# Patient Record
(demographics unavailable — no encounter records)

---

## 2024-10-31 NOTE — PHYSICAL EXAM
[de-identified] : General appearance: well-nourished and hydrated, pleasant, alert and oriented x 3, cooperative. HEENT: Normocephalic, EOM intact, Nasal septum midline, Oral cavity clear, External auditory canal clear. Cardiovascular: no apparent abnormalities, no lower leg edema, no varicosities, pedal pulses are palpable. Lymphatics Lymph nodes: none palpated, Lymphedema: not present. Neurologic: sensation is normal, no muscle weakness in upper or lower extremities, patella tendon reflexes intact. Dermatologic no apparent skin lesions, moist, warm, no rash. Spine: cervical spine appears normal and moves freely, thoracic spine appears normal and moves freely, lumbosacral spine appears normal and moves freely. Gait: nonantalgic.   Left knee Inspection: no effusion or erythema. Wounds: none. Alignment: normal. Palpation: no specific tenderness on palpation. ROM active (in degrees): 0-135 with crepitus and pain Ligamentous laxity: all ligaments appear stable,, negative ant. drawer test, negative post. drawer test, stable to varus stress test, stable to valgus stress test. negative Lachman's test, negative pivot shift test Meniscal Test: negative McMurrays, negative Lilian. Patellofemoral Alignment Test: Q angle-, normal. Muscle Test: good quad strength. Leg examination: calf is soft and non-tender.   Right knee Inspection: no effusion or erythema. Wounds: none. Alignment: normal. Palpation: no specific tenderness on palpation. ROM active (in degrees): 0-135 with crepitus Ligamentous laxity: all ligaments appear stable, negative ant. drawer test, negative post. drawer test, stable to varus stress test, stable to valgus stress test. negative Lachman's test, negative pivot shift test Meniscal Test: negative McMurrays, negative Lilian. Patellofemoral Alignment Test: Q angle-, normal. Muscle Test: good quad strength. Leg examination: calf soft and non tender.   Left hip Inspection: No swelling or ecchymosis. Wounds: none. Palpation: non-tender. Stability: no instability. Strength: 5/5 all motor groups. ROM: no pain with FROM. Leg length: equal.   Right hip Inspection: No swelling or ecchymosis. Wounds: none. Palpation: non-tender. Stability: no instability. Strength: 5/5 all motor groups. ROM: no pain with FROM. Leg length: equal.   Left ankle Inspection: no erythema noted, no swelling noted. Palpation: no pain on palpation. ROM: FROM without crepitus. Muscle strength: 5/5. Stability: no instability noted.   Right ankle Inspection: no erythema noted, no swelling noted. ROM: FROM without crepitus. Palpation: no pain on palpation. Muscle strength: 5/5. Stability: no instability noted.   Left foot Inspection: color, texture and turgor are normal. ROM: full range of motion of all joints without pain or crepitus. Palpation: no tenderness. Stability: no instability noted.   Right foot Inspection: color, texture and turgor are normal. ROM: full range of motion of all joints without pain or crepitus. Palpation: no tenderness. Stability: no instability noted.   Left shoulder Inspection: no muscle asymmetry, no atrophy. Palpation: no tenderness noted, ACJ non-tender. ROM: full active ROM, full passive ROM. Strength testing): anterior deltoid, supraspinatus, infraspinatus, subscapularis all 5/5. Stability test: ant. apprehension negative, post. apprehension negative, relocation test negative. Impingement Test: negative NEER.   Right shoulder Inspection: no muscle asymmetry, no atrophy. Palpation: no tenderness noted, ACJ non-tender. ROM: full active ROM, full passive ROM. Strength testing): anterior deltoid, supraspinatus, infraspinatus, subscapularis all 5/5. Stability test: ant. apprehension negative, post. apprehension negative, relocation test negative. Impingement Test: negative NEER. Surgical Wounds: none.   Left elbow Inspection: negative swelling. Wounds: none. Palpation: non-tender. ROM: full ROM. Strength: 5/5 all groups. Stability: no instability. Mass: none.   Right elbow Inspection: negative swelling. Wounds: none. Palpation: non-tender. ROM: full ROM. Strength: 5/5 all groups. Stability: no instability. Mass: none.   Left wrist Inspection: negative swelling. Wound: none. Palpation (bone): no tenderness. ROM: full ROM. Strength: full , good.   Right wrist Inspection: negative swelling. Wound: none. Palpation (bone): no tenderness. ROM: full ROM. Strength: full , good.   Left hand Inspection: no skin changes, normal appearance. Wounds: none. Strength: full , able to make full fist. Sensation: light touch intact all fingers and thumb. Vascular: good capillary refill < 3 seconds, all fingers and thumb. Mass: none.   Right hand Inspection: no skin changes, normal appearance. Wounds: none. Strength: full , able to make full fist. Sensation: light touch intact all fingers and thumb. Vascular: good capillary refill < 3 seconds, all fingers and thumb. Mass: none. [de-identified] : Left knee xrays, standing AP/Lateral and Merchant films, and 45 degree PA standing view, taken at the office today show normal alignment, joint space maintained, slight traction spur superior pole of patella at central position, patellofemoral joint space narrowing consistent with patellofemoral arthritis.   Right knee x-ray merchant view taken at the office today demonstrates joint space narrowing, osteophytes and a well centered patella with Patellofemoral arthritis.   MRI left knee taken 05/30/2024: films brought in and reviewed, see attached report. I agree with radiologist report.  AP pelvis x-rays taken at the office today show mild degenerative arthritis, small acetabular osteophyte, multiple sutures in pelvic region.  AP/lateral x-rays of LS spine taken in office today shows normal alignment and disc narrowing of L2-L5.

## 2024-10-31 NOTE — DISCUSSION/SUMMARY
[de-identified] : Discussed at length the nature of the patients condition. Their right knee symptoms appear secondary to mild degenerative arthritis. However, I think most of his symptoms are related to lumbar radiculopathy, DDD, degenerative arthritis in both hips, and lumbar spondylosis. We will begin non-operatively. In the interim, patient was given a right knee cortisone injection today as detailed above for symptomatic relief. I also suggested PT, and Advil or Aleve prn. I also referred him to Dr. Heath for a spine evaluation, which he would need an MRI. They can continue activities as tolerated.  I will see him back in 6-8 weeks after he sees Dr. Heath.

## 2024-10-31 NOTE — DISCUSSION/SUMMARY
[de-identified] : Discussed at length the nature of the patients condition. Their right knee symptoms appear secondary to mild degenerative arthritis. However, I think most of his symptoms are related to lumbar radiculopathy, DDD, degenerative arthritis in both hips, and lumbar spondylosis. We will begin non-operatively. In the interim, patient was given a right knee cortisone injection today as detailed above for symptomatic relief. I also suggested PT, and Advil or Aleve prn. I also referred him to Dr. Heath for a spine evaluation, which he would need an MRI. They can continue activities as tolerated.  I will see him back in 6-8 weeks after he sees Dr. Heath.

## 2024-10-31 NOTE — PHYSICAL EXAM
[de-identified] : General appearance: well-nourished and hydrated, pleasant, alert and oriented x 3, cooperative. HEENT: Normocephalic, EOM intact, Nasal septum midline, Oral cavity clear, External auditory canal clear. Cardiovascular: no apparent abnormalities, no lower leg edema, no varicosities, pedal pulses are palpable. Lymphatics Lymph nodes: none palpated, Lymphedema: not present. Neurologic: sensation is normal, no muscle weakness in upper or lower extremities, patella tendon reflexes intact. Dermatologic no apparent skin lesions, moist, warm, no rash. Spine: cervical spine appears normal and moves freely, thoracic spine appears normal and moves freely, lumbosacral spine appears normal and moves freely. Gait: nonantalgic.   Left knee Inspection: no effusion or erythema. Wounds: none. Alignment: normal. Palpation: no specific tenderness on palpation. ROM active (in degrees): 0-135 with crepitus and pain Ligamentous laxity: all ligaments appear stable,, negative ant. drawer test, negative post. drawer test, stable to varus stress test, stable to valgus stress test. negative Lachman's test, negative pivot shift test Meniscal Test: negative McMurrays, negative Lilian. Patellofemoral Alignment Test: Q angle-, normal. Muscle Test: good quad strength. Leg examination: calf is soft and non-tender.   Right knee Inspection: no effusion or erythema. Wounds: none. Alignment: normal. Palpation: no specific tenderness on palpation. ROM active (in degrees): 0-135 with crepitus Ligamentous laxity: all ligaments appear stable, negative ant. drawer test, negative post. drawer test, stable to varus stress test, stable to valgus stress test. negative Lachman's test, negative pivot shift test Meniscal Test: negative McMurrays, negative Lilian. Patellofemoral Alignment Test: Q angle-, normal. Muscle Test: good quad strength. Leg examination: calf soft and non tender.   Left hip Inspection: No swelling or ecchymosis. Wounds: none. Palpation: non-tender. Stability: no instability. Strength: 5/5 all motor groups. ROM: no pain with FROM. Leg length: equal.   Right hip Inspection: No swelling or ecchymosis. Wounds: none. Palpation: non-tender. Stability: no instability. Strength: 5/5 all motor groups. ROM: no pain with FROM. Leg length: equal.   Left ankle Inspection: no erythema noted, no swelling noted. Palpation: no pain on palpation. ROM: FROM without crepitus. Muscle strength: 5/5. Stability: no instability noted.   Right ankle Inspection: no erythema noted, no swelling noted. ROM: FROM without crepitus. Palpation: no pain on palpation. Muscle strength: 5/5. Stability: no instability noted.   Left foot Inspection: color, texture and turgor are normal. ROM: full range of motion of all joints without pain or crepitus. Palpation: no tenderness. Stability: no instability noted.   Right foot Inspection: color, texture and turgor are normal. ROM: full range of motion of all joints without pain or crepitus. Palpation: no tenderness. Stability: no instability noted.   Left shoulder Inspection: no muscle asymmetry, no atrophy. Palpation: no tenderness noted, ACJ non-tender. ROM: full active ROM, full passive ROM. Strength testing): anterior deltoid, supraspinatus, infraspinatus, subscapularis all 5/5. Stability test: ant. apprehension negative, post. apprehension negative, relocation test negative. Impingement Test: negative NEER.   Right shoulder Inspection: no muscle asymmetry, no atrophy. Palpation: no tenderness noted, ACJ non-tender. ROM: full active ROM, full passive ROM. Strength testing): anterior deltoid, supraspinatus, infraspinatus, subscapularis all 5/5. Stability test: ant. apprehension negative, post. apprehension negative, relocation test negative. Impingement Test: negative NEER. Surgical Wounds: none.   Left elbow Inspection: negative swelling. Wounds: none. Palpation: non-tender. ROM: full ROM. Strength: 5/5 all groups. Stability: no instability. Mass: none.   Right elbow Inspection: negative swelling. Wounds: none. Palpation: non-tender. ROM: full ROM. Strength: 5/5 all groups. Stability: no instability. Mass: none.   Left wrist Inspection: negative swelling. Wound: none. Palpation (bone): no tenderness. ROM: full ROM. Strength: full , good.   Right wrist Inspection: negative swelling. Wound: none. Palpation (bone): no tenderness. ROM: full ROM. Strength: full , good.   Left hand Inspection: no skin changes, normal appearance. Wounds: none. Strength: full , able to make full fist. Sensation: light touch intact all fingers and thumb. Vascular: good capillary refill < 3 seconds, all fingers and thumb. Mass: none.   Right hand Inspection: no skin changes, normal appearance. Wounds: none. Strength: full , able to make full fist. Sensation: light touch intact all fingers and thumb. Vascular: good capillary refill < 3 seconds, all fingers and thumb. Mass: none. [de-identified] : Left knee xrays, standing AP/Lateral and Merchant films, and 45 degree PA standing view, taken at the office today show normal alignment, joint space maintained, slight traction spur superior pole of patella at central position, patellofemoral joint space narrowing consistent with patellofemoral arthritis.   Right knee x-ray merchant view taken at the office today demonstrates joint space narrowing, osteophytes and a well centered patella with Patellofemoral arthritis.   MRI left knee taken 05/30/2024: films brought in and reviewed, see attached report. I agree with radiologist report.  AP pelvis x-rays taken at the office today show mild degenerative arthritis, small acetabular osteophyte, multiple sutures in pelvic region.  AP/lateral x-rays of LS spine taken in office today shows normal alignment and disc narrowing of L2-L5.

## 2024-10-31 NOTE — PROCEDURE
[de-identified] : LEFT KNEE CORTISONE INJECTION Discussed at length with the patient the planned steroid and lidocaine injection. The risks, benefits, convalescence and alternatives were reviewed. The possible side effects discussed included but were not limited to: pain, swelling, heat and redness. These symptoms are generally mild but if they are extensive then contact the office. Giving pain relievers by mouth such as NSAIDs or Tylenol can generally treat the reactions to steroid and lidocaine. Rare cases of infection have been noted. Rash, hives and itching may occur post injection. If you have muscle pain or cramps, flushing and or swelling of the face, rapid heart beat, nausea, dizziness, fever, chills, headache, difficulty breathing, swelling in the arms or legs, or have a prickly feeling of your skin, contact a health care provider immediately.  Following this discussion, the knee was prepped with betadine and under sterile conditions 5 cc of 2% lidocaine and 1 cc depo-medrol (40mg) were injected with a 21 gauge needle. The needle was introduced into the joint, aspiration was performed to ensure intra-articular placement and the medication was injected. Upon withdrawal of the needle the site was cleaned with alcohol and a bandaid applied. The patient tolerated the injection well and there were no adverse effects. Post injection instructions included no strenuous activity for 24 hours, cryotherapy and if there are any adverse effects to contact the office.

## 2024-10-31 NOTE — HISTORY OF PRESENT ILLNESS
[de-identified] : NISHANT GREGG 82-year-old male presents for initial evaluation of left knee pain. Patient has been experiencing this left knee pain for 7 months now. Patient's pain was onset by a twisting injury while ambulating up the stairs. Patient's pain is exacerbated when going upstairs and rising from seated position. Patient reports the pain radiates up his leg to his thigh. He denies any clicking or buckling while walking and experienced no loss of motion. Patient was taking Ibuprofen for pain management but replaced it with Tylenol as prn. He reports that previous PT worsened his knee symptoms.   Patient is established at [facility]. Here, he was diagnosed with a quadricep injury by Dr. Domínguez. He was evaluated for a Baker's cyst at this location by Dr. Reynolds. He received course of oral steroids and PT by Dr. Tapia in July.   Patient has medical history of GERD, hyperlipidemia, and coronary artery disease which he follows up with cardiologist for. Patient has an allergy to aspirin= produces a rash.

## 2024-10-31 NOTE — ADDENDUM
[FreeTextEntry1] : This note was written by Devante Horne on 10/30/2024 acting as scribe for Dr. Amos SHANKS I, Dr. Amos Tapia, have read and attest that all the information, medical decision making and discharge instructions within are true and accurate.   This note was written by Jamari Sow on 10/30/2024  acting as scribe for Dr. Amos SHANKS I, Dr. Amos Tapia, have read and attest that all the information, medical decision making and discharge instructions within are true and accurate.

## 2024-10-31 NOTE — PROCEDURE
[de-identified] : LEFT KNEE CORTISONE INJECTION Discussed at length with the patient the planned steroid and lidocaine injection. The risks, benefits, convalescence and alternatives were reviewed. The possible side effects discussed included but were not limited to: pain, swelling, heat and redness. These symptoms are generally mild but if they are extensive then contact the office. Giving pain relievers by mouth such as NSAIDs or Tylenol can generally treat the reactions to steroid and lidocaine. Rare cases of infection have been noted. Rash, hives and itching may occur post injection. If you have muscle pain or cramps, flushing and or swelling of the face, rapid heart beat, nausea, dizziness, fever, chills, headache, difficulty breathing, swelling in the arms or legs, or have a prickly feeling of your skin, contact a health care provider immediately.  Following this discussion, the knee was prepped with betadine and under sterile conditions 5 cc of 2% lidocaine and 1 cc depo-medrol (40mg) were injected with a 21 gauge needle. The needle was introduced into the joint, aspiration was performed to ensure intra-articular placement and the medication was injected. Upon withdrawal of the needle the site was cleaned with alcohol and a bandaid applied. The patient tolerated the injection well and there were no adverse effects. Post injection instructions included no strenuous activity for 24 hours, cryotherapy and if there are any adverse effects to contact the office.

## 2024-10-31 NOTE — HISTORY OF PRESENT ILLNESS
[de-identified] : NISHNAT GREGG 82-year-old male presents for initial evaluation of left knee pain. Patient has been experiencing this left knee pain for 7 months now. Patient's pain was onset by a twisting injury while ambulating up the stairs. Patient's pain is exacerbated when going upstairs and rising from seated position. Patient reports the pain radiates up his leg to his thigh. He denies any clicking or buckling while walking and experienced no loss of motion. Patient was taking Ibuprofen for pain management but replaced it with Tylenol as prn. He reports that previous PT worsened his knee symptoms.   Patient is established at [facility]. Here, he was diagnosed with a quadricep injury by Dr. Domínguez. He was evaluated for a Baker's cyst at this location by Dr. Reynolds. He received course of oral steroids and PT by Dr. Tapia in July.   Patient has medical history of GERD, hyperlipidemia, and coronary artery disease which he follows up with cardiologist for. Patient has an allergy to aspirin= produces a rash.

## 2024-12-20 NOTE — PHYSICAL EXAM
[de-identified] : General appearance: well-nourished and hydrated, pleasant, alert and oriented x 3, cooperative. HEENT: Normocephalic, EOM intact, Nasal septum midline, Oral cavity clear, External auditory canal clear. Cardiovascular: no apparent abnormalities, no lower leg edema, no varicosities, pedal pulses are palpable. Lymphatics Lymph nodes: none palpated, Lymphedema: not present. Neurologic: sensation is normal, no muscle weakness in upper or lower extremities, patella tendon reflexes intact. Dermatologic no apparent skin lesions, moist, warm, no rash. Spine: cervical spine appears normal and moves freely, thoracic spine appears normal and moves freely, lumbosacral spine appears normal and moves freely. Gait: nonantalgic.   Left knee Inspection: no effusion or erythema. Wounds: none. Alignment: normal. Palpation: no specific tenderness on palpation. ROM active (in degrees): 0-135 with crepitus and pain Ligamentous laxity: all ligaments appear stable,, negative ant. drawer test, negative post. drawer test, stable to varus stress test, stable to valgus stress test. negative Lachman's test, negative pivot shift test Meniscal Test: negative McMurrays, negative Lilian. Patellofemoral Alignment Test: Q angle-, normal. Muscle Test: good quad strength. Leg examination: calf is soft and non-tender.   Right knee Inspection: no effusion or erythema. Wounds: none. Alignment: normal. Palpation: no specific tenderness on palpation. ROM active (in degrees): 0-135 with crepitus Ligamentous laxity: all ligaments appear stable, negative ant. drawer test, negative post. drawer test, stable to varus stress test, stable to valgus stress test. negative Lachman's test, negative pivot shift test Meniscal Test: negative McMurrays, negative Lilian. Patellofemoral Alignment Test: Q angle-, normal. Muscle Test: good quad strength. Leg examination: calf soft and non tender.   Left hip Inspection: No swelling or ecchymosis. Wounds: none. Palpation: non-tender. Stability: no instability. Strength: 5/5 all motor groups. ROM: no pain with FROM. Leg length: equal.   Right hip Inspection: No swelling or ecchymosis. Wounds: none. Palpation: non-tender. Stability: no instability. Strength: 5/5 all motor groups. ROM: no pain with FROM. Leg length: equal.   Left ankle Inspection: no erythema noted, no swelling noted. Palpation: no pain on palpation. ROM: FROM without crepitus. Muscle strength: 5/5. Stability: no instability noted.   Right ankle Inspection: no erythema noted, no swelling noted. ROM: FROM without crepitus. Palpation: no pain on palpation. Muscle strength: 5/5. Stability: no instability noted.   Left foot Inspection: color, texture and turgor are normal. ROM: full range of motion of all joints without pain or crepitus. Palpation: no tenderness. Stability: no instability noted.   Right foot Inspection: color, texture and turgor are normal. ROM: full range of motion of all joints without pain or crepitus. Palpation: no tenderness. Stability: no instability noted.   Left shoulder Inspection: no muscle asymmetry, no atrophy. Palpation: no tenderness noted, ACJ non-tender. ROM: full active ROM, full passive ROM. Strength testing): anterior deltoid, supraspinatus, infraspinatus, subscapularis all 5/5. Stability test: ant. apprehension negative, post. apprehension negative, relocation test negative. Impingement Test: negative NEER.   Right shoulder Inspection: no muscle asymmetry, no atrophy. Palpation: no tenderness noted, ACJ non-tender. ROM: full active ROM, full passive ROM. Strength testing): anterior deltoid, supraspinatus, infraspinatus, subscapularis all 5/5. Stability test: ant. apprehension negative, post. apprehension negative, relocation test negative. Impingement Test: negative NEER. Surgical Wounds: none.   Left elbow Inspection: negative swelling. Wounds: none. Palpation: non-tender. ROM: full ROM. Strength: 5/5 all groups. Stability: no instability. Mass: none.   Right elbow Inspection: negative swelling. Wounds: none. Palpation: non-tender. ROM: full ROM. Strength: 5/5 all groups. Stability: no instability. Mass: none.   Left wrist Inspection: negative swelling. Wound: none. Palpation (bone): no tenderness. ROM: full ROM. Strength: full , good.   Right wrist Inspection: negative swelling. Wound: none. Palpation (bone): no tenderness. ROM: full ROM. Strength: full , good.   Left hand Inspection: no skin changes, normal appearance. Wounds: none. Strength: full , able to make full fist. Sensation: light touch intact all fingers and thumb. Vascular: good capillary refill < 3 seconds, all fingers and thumb. Mass: none.   Right hand Inspection: no skin changes, normal appearance. Wounds: none. Strength: full , able to make full fist. Sensation: light touch intact all fingers and thumb. Vascular: good capillary refill < 3 seconds, all fingers and thumb. Mass: none. [de-identified] : Left knee xrays, standing AP/Lateral and Merchant films, and 45 degree PA standing view, taken at the office today show normal alignment, joint space maintained, slight traction spur superior pole of patella at central position, patellofemoral joint space narrowing consistent with patellofemoral arthritis.   Right knee x-ray merchant view taken at the office today demonstrates joint space narrowing, osteophytes and a well centered patella with Patellofemoral arthritis.   MRI left knee taken 05/30/2024: films brought in and reviewed, see attached report. I agree with radiologist report.  AP pelvis x-rays taken at the office today show mild degenerative arthritis, small acetabular osteophyte, multiple sutures in pelvic region.  AP/lateral x-rays of LS spine taken in office today shows normal alignment and disc narrowing of L2-L5.

## 2024-12-20 NOTE — ADDENDUM
[FreeTextEntry1] :    This note was written by Mike Tejeda on 12/18/2024 acting as scribe for Dr. Amos Tapia M.D.   I, Dr. Amos Tapia, have read and attest that all the information, medical decision making and discharge instructions within are true and accurate.

## 2024-12-20 NOTE — HISTORY OF PRESENT ILLNESS
[de-identified] : NISHANT GREGG 82 year old male presents for evaluation of B/L knee OA. mostly on his left knee. He did receive a left knee Cortisone injection which he feels has no help. We did refer him to Dr. Heath who diagnosed her for lumbar spondylolysis, however, Dr. Heath did not believe it contributed to his knee pain. The pt does have pain in the posterior aspect of the knee with flexion. He does take stairs one step at a time, has had PT in the past which did not help, and notes having an MRI to rule out a cyst which was also negative. The pt was taking Ibuprofen but has since stopped because of concern about the side effects.

## 2024-12-20 NOTE — PHYSICAL EXAM
[de-identified] : General appearance: well-nourished and hydrated, pleasant, alert and oriented x 3, cooperative. HEENT: Normocephalic, EOM intact, Nasal septum midline, Oral cavity clear, External auditory canal clear. Cardiovascular: no apparent abnormalities, no lower leg edema, no varicosities, pedal pulses are palpable. Lymphatics Lymph nodes: none palpated, Lymphedema: not present. Neurologic: sensation is normal, no muscle weakness in upper or lower extremities, patella tendon reflexes intact. Dermatologic no apparent skin lesions, moist, warm, no rash. Spine: cervical spine appears normal and moves freely, thoracic spine appears normal and moves freely, lumbosacral spine appears normal and moves freely. Gait: nonantalgic.   Left knee Inspection: no effusion or erythema. Wounds: none. Alignment: normal. Palpation: no specific tenderness on palpation. ROM active (in degrees): 0-135 with crepitus and pain Ligamentous laxity: all ligaments appear stable,, negative ant. drawer test, negative post. drawer test, stable to varus stress test, stable to valgus stress test. negative Lachman's test, negative pivot shift test Meniscal Test: negative McMurrays, negative Lilian. Patellofemoral Alignment Test: Q angle-, normal. Muscle Test: good quad strength. Leg examination: calf is soft and non-tender.   Right knee Inspection: no effusion or erythema. Wounds: none. Alignment: normal. Palpation: no specific tenderness on palpation. ROM active (in degrees): 0-135 with crepitus Ligamentous laxity: all ligaments appear stable, negative ant. drawer test, negative post. drawer test, stable to varus stress test, stable to valgus stress test. negative Lachman's test, negative pivot shift test Meniscal Test: negative McMurrays, negative Lilian. Patellofemoral Alignment Test: Q angle-, normal. Muscle Test: good quad strength. Leg examination: calf soft and non tender.   Left hip Inspection: No swelling or ecchymosis. Wounds: none. Palpation: non-tender. Stability: no instability. Strength: 5/5 all motor groups. ROM: no pain with FROM. Leg length: equal.   Right hip Inspection: No swelling or ecchymosis. Wounds: none. Palpation: non-tender. Stability: no instability. Strength: 5/5 all motor groups. ROM: no pain with FROM. Leg length: equal.   Left ankle Inspection: no erythema noted, no swelling noted. Palpation: no pain on palpation. ROM: FROM without crepitus. Muscle strength: 5/5. Stability: no instability noted.   Right ankle Inspection: no erythema noted, no swelling noted. ROM: FROM without crepitus. Palpation: no pain on palpation. Muscle strength: 5/5. Stability: no instability noted.   Left foot Inspection: color, texture and turgor are normal. ROM: full range of motion of all joints without pain or crepitus. Palpation: no tenderness. Stability: no instability noted.   Right foot Inspection: color, texture and turgor are normal. ROM: full range of motion of all joints without pain or crepitus. Palpation: no tenderness. Stability: no instability noted.   Left shoulder Inspection: no muscle asymmetry, no atrophy. Palpation: no tenderness noted, ACJ non-tender. ROM: full active ROM, full passive ROM. Strength testing): anterior deltoid, supraspinatus, infraspinatus, subscapularis all 5/5. Stability test: ant. apprehension negative, post. apprehension negative, relocation test negative. Impingement Test: negative NEER.   Right shoulder Inspection: no muscle asymmetry, no atrophy. Palpation: no tenderness noted, ACJ non-tender. ROM: full active ROM, full passive ROM. Strength testing): anterior deltoid, supraspinatus, infraspinatus, subscapularis all 5/5. Stability test: ant. apprehension negative, post. apprehension negative, relocation test negative. Impingement Test: negative NEER. Surgical Wounds: none.   Left elbow Inspection: negative swelling. Wounds: none. Palpation: non-tender. ROM: full ROM. Strength: 5/5 all groups. Stability: no instability. Mass: none.   Right elbow Inspection: negative swelling. Wounds: none. Palpation: non-tender. ROM: full ROM. Strength: 5/5 all groups. Stability: no instability. Mass: none.   Left wrist Inspection: negative swelling. Wound: none. Palpation (bone): no tenderness. ROM: full ROM. Strength: full , good.   Right wrist Inspection: negative swelling. Wound: none. Palpation (bone): no tenderness. ROM: full ROM. Strength: full , good.   Left hand Inspection: no skin changes, normal appearance. Wounds: none. Strength: full , able to make full fist. Sensation: light touch intact all fingers and thumb. Vascular: good capillary refill < 3 seconds, all fingers and thumb. Mass: none.   Right hand Inspection: no skin changes, normal appearance. Wounds: none. Strength: full , able to make full fist. Sensation: light touch intact all fingers and thumb. Vascular: good capillary refill < 3 seconds, all fingers and thumb. Mass: none. [de-identified] : Left knee xrays, standing AP/Lateral and Merchant films, and 45 degree PA standing view, taken at the office today show normal alignment, joint space maintained, slight traction spur superior pole of patella at central position, patellofemoral joint space narrowing consistent with patellofemoral arthritis.   Right knee x-ray merchant view taken at the office today demonstrates joint space narrowing, osteophytes and a well centered patella with Patellofemoral arthritis.   MRI left knee taken 05/30/2024: films brought in and reviewed, see attached report. I agree with radiologist report.  AP pelvis x-rays taken at the office today show mild degenerative arthritis, small acetabular osteophyte, multiple sutures in pelvic region.  AP/lateral x-rays of LS spine taken in office today shows normal alignment and disc narrowing of L2-L5.

## 2024-12-20 NOTE — DISCUSSION/SUMMARY
[de-identified] : Discussed at length the nature of the patients condition.     continued lef tknee paosin posteroo fossa radiitng pain foot ankle neuropathic related to his lower bck seen dr kennedy gibson past diagnised lumbar sponylois , he is looking for nerve test and orde d emg ner convodton test lower extrmeietin caus eof paun repat mri of left knee. MRI lef tknee evlauate pathology or nerve entrapmenet wsill also mri ls smir rule out budling disck or stenosis  inidng sig diabilityt impacting wuality life and perpecing t fins out etilogy of his diasabukity

## 2024-12-20 NOTE — DISCUSSION/SUMMARY
[de-identified] : Discussed at length the nature of the patients condition.     continued lef tknee paosin posteroo fossa radiitng pain foot ankle neuropathic related to his lower bck seen dr kennedy gibson past diagnised lumbar sponylois , he is looking for nerve test and orde d emg ner convodton test lower extrmeietin caus eof paun repat mri of left knee. MRI lef tknee evlauate pathology or nerve entrapmenet wsill also mri ls smir rule out budling disck or stenosis  inidng sig diabilityt impacting wuality life and perpecing t fins out etilogy of his diasabukity

## 2024-12-20 NOTE — HISTORY OF PRESENT ILLNESS
[de-identified] : NISHANT GREGG 82 year old male presents for evaluation of B/L knee OA. mostly on his left knee. He did receive a left knee Cortisone injection which he feels has no help. We did refer him to Dr. Heath who diagnosed her for lumbar spondylolysis, however, Dr. Heath did not believe it contributed to his knee pain. The pt does have pain in the posterior aspect of the knee with flexion. He does take stairs one step at a time, has had PT in the past which did not help, and notes having an MRI to rule out a cyst which was also negative. The pt was taking Ibuprofen but has since stopped because of concern about the side effects.

## 2024-12-20 NOTE — DISCUSSION/SUMMARY
[de-identified] : Discussed at length the nature of the patients condition.     continued lef tknee paosin posteroo fossa radiitng pain foot ankle neuropathic related to his lower bck seen dr kennedy gibson past diagnised lumbar sponylois , he is looking for nerve test and orde d emg ner convodton test lower extrmeietin caus eof paun repat mri of left knee. MRI lef tknee evlauate pathology or nerve entrapmenet wsill also mri ls smir rule out budling disck or stenosis  inidng sig diabilityt impacting wuality life and perpecing t fins out etilogy of his diasabukity

## 2024-12-20 NOTE — HISTORY OF PRESENT ILLNESS
[de-identified] : NISHANT GREGG 82 year old male presents for evaluation of B/L knee OA. mostly on his left knee. He did receive a left knee Cortisone injection which he feels has no help. We did refer him to Dr. Heath who diagnosed her for lumbar spondylolysis, however, Dr. Heath did not believe it contributed to his knee pain. The pt does have pain in the posterior aspect of the knee with flexion. He does take stairs one step at a time, has had PT in the past which did not help, and notes having an MRI to rule out a cyst which was also negative. The pt was taking Ibuprofen but has since stopped because of concern about the side effects.

## 2024-12-20 NOTE — PHYSICAL EXAM
[de-identified] : General appearance: well-nourished and hydrated, pleasant, alert and oriented x 3, cooperative. HEENT: Normocephalic, EOM intact, Nasal septum midline, Oral cavity clear, External auditory canal clear. Cardiovascular: no apparent abnormalities, no lower leg edema, no varicosities, pedal pulses are palpable. Lymphatics Lymph nodes: none palpated, Lymphedema: not present. Neurologic: sensation is normal, no muscle weakness in upper or lower extremities, patella tendon reflexes intact. Dermatologic no apparent skin lesions, moist, warm, no rash. Spine: cervical spine appears normal and moves freely, thoracic spine appears normal and moves freely, lumbosacral spine appears normal and moves freely. Gait: nonantalgic.   Left knee Inspection: no effusion or erythema. Wounds: none. Alignment: normal. Palpation: no specific tenderness on palpation. ROM active (in degrees): 0-135 with crepitus and pain Ligamentous laxity: all ligaments appear stable,, negative ant. drawer test, negative post. drawer test, stable to varus stress test, stable to valgus stress test. negative Lachman's test, negative pivot shift test Meniscal Test: negative McMurrays, negative Lilian. Patellofemoral Alignment Test: Q angle-, normal. Muscle Test: good quad strength. Leg examination: calf is soft and non-tender.   Right knee Inspection: no effusion or erythema. Wounds: none. Alignment: normal. Palpation: no specific tenderness on palpation. ROM active (in degrees): 0-135 with crepitus Ligamentous laxity: all ligaments appear stable, negative ant. drawer test, negative post. drawer test, stable to varus stress test, stable to valgus stress test. negative Lachman's test, negative pivot shift test Meniscal Test: negative McMurrays, negative Lilian. Patellofemoral Alignment Test: Q angle-, normal. Muscle Test: good quad strength. Leg examination: calf soft and non tender.   Left hip Inspection: No swelling or ecchymosis. Wounds: none. Palpation: non-tender. Stability: no instability. Strength: 5/5 all motor groups. ROM: no pain with FROM. Leg length: equal.   Right hip Inspection: No swelling or ecchymosis. Wounds: none. Palpation: non-tender. Stability: no instability. Strength: 5/5 all motor groups. ROM: no pain with FROM. Leg length: equal.   Left ankle Inspection: no erythema noted, no swelling noted. Palpation: no pain on palpation. ROM: FROM without crepitus. Muscle strength: 5/5. Stability: no instability noted.   Right ankle Inspection: no erythema noted, no swelling noted. ROM: FROM without crepitus. Palpation: no pain on palpation. Muscle strength: 5/5. Stability: no instability noted.   Left foot Inspection: color, texture and turgor are normal. ROM: full range of motion of all joints without pain or crepitus. Palpation: no tenderness. Stability: no instability noted.   Right foot Inspection: color, texture and turgor are normal. ROM: full range of motion of all joints without pain or crepitus. Palpation: no tenderness. Stability: no instability noted.   Left shoulder Inspection: no muscle asymmetry, no atrophy. Palpation: no tenderness noted, ACJ non-tender. ROM: full active ROM, full passive ROM. Strength testing): anterior deltoid, supraspinatus, infraspinatus, subscapularis all 5/5. Stability test: ant. apprehension negative, post. apprehension negative, relocation test negative. Impingement Test: negative NEER.   Right shoulder Inspection: no muscle asymmetry, no atrophy. Palpation: no tenderness noted, ACJ non-tender. ROM: full active ROM, full passive ROM. Strength testing): anterior deltoid, supraspinatus, infraspinatus, subscapularis all 5/5. Stability test: ant. apprehension negative, post. apprehension negative, relocation test negative. Impingement Test: negative NEER. Surgical Wounds: none.   Left elbow Inspection: negative swelling. Wounds: none. Palpation: non-tender. ROM: full ROM. Strength: 5/5 all groups. Stability: no instability. Mass: none.   Right elbow Inspection: negative swelling. Wounds: none. Palpation: non-tender. ROM: full ROM. Strength: 5/5 all groups. Stability: no instability. Mass: none.   Left wrist Inspection: negative swelling. Wound: none. Palpation (bone): no tenderness. ROM: full ROM. Strength: full , good.   Right wrist Inspection: negative swelling. Wound: none. Palpation (bone): no tenderness. ROM: full ROM. Strength: full , good.   Left hand Inspection: no skin changes, normal appearance. Wounds: none. Strength: full , able to make full fist. Sensation: light touch intact all fingers and thumb. Vascular: good capillary refill < 3 seconds, all fingers and thumb. Mass: none.   Right hand Inspection: no skin changes, normal appearance. Wounds: none. Strength: full , able to make full fist. Sensation: light touch intact all fingers and thumb. Vascular: good capillary refill < 3 seconds, all fingers and thumb. Mass: none. [de-identified] : Left knee xrays, standing AP/Lateral and Merchant films, and 45 degree PA standing view, taken at the office today show normal alignment, joint space maintained, slight traction spur superior pole of patella at central position, patellofemoral joint space narrowing consistent with patellofemoral arthritis.   Right knee x-ray merchant view taken at the office today demonstrates joint space narrowing, osteophytes and a well centered patella with Patellofemoral arthritis.   MRI left knee taken 05/30/2024: films brought in and reviewed, see attached report. I agree with radiologist report.  AP pelvis x-rays taken at the office today show mild degenerative arthritis, small acetabular osteophyte, multiple sutures in pelvic region.  AP/lateral x-rays of LS spine taken in office today shows normal alignment and disc narrowing of L2-L5.